# Patient Record
Sex: MALE | Race: WHITE | URBAN - METROPOLITAN AREA
[De-identification: names, ages, dates, MRNs, and addresses within clinical notes are randomized per-mention and may not be internally consistent; named-entity substitution may affect disease eponyms.]

---

## 2017-02-17 ENCOUNTER — HOSPITAL ENCOUNTER (OUTPATIENT)
Dept: SURGERY | Age: 37
Discharge: HOME | End: 2017-02-17
Attending: ORTHOPAEDIC SURGERY | Admitting: ORTHOPAEDIC SURGERY

## 2017-02-17 VITALS
DIASTOLIC BLOOD PRESSURE: 82 MMHG | WEIGHT: 206 LBS | RESPIRATION RATE: 20 BRPM | TEMPERATURE: 97.2 F | HEART RATE: 79 BPM | SYSTOLIC BLOOD PRESSURE: 117 MMHG | HEIGHT: 70 IN | OXYGEN SATURATION: 97 % | BODY MASS INDEX: 29.49 KG/M2

## 2017-02-17 RX ORDER — OXYCODONE HYDROCHLORIDE AND ACETAMINOPHEN 5; 325 MG/1; MG/1
2 TABLET ORAL PRN
Status: COMPLETED | OUTPATIENT
Start: 2017-02-17 | End: 2017-02-17

## 2017-02-17 RX ORDER — ONDANSETRON 2 MG/ML
4 INJECTION INTRAMUSCULAR; INTRAVENOUS PRN
Status: DISCONTINUED | OUTPATIENT
Start: 2017-02-17 | End: 2017-02-18 | Stop reason: HOSPADM

## 2017-02-17 RX ORDER — MEPERIDINE HYDROCHLORIDE 50 MG/ML
12.5 INJECTION INTRAMUSCULAR; INTRAVENOUS; SUBCUTANEOUS EVERY 5 MIN PRN
Status: DISCONTINUED | OUTPATIENT
Start: 2017-02-17 | End: 2017-02-18 | Stop reason: HOSPADM

## 2017-02-17 RX ORDER — PROMETHAZINE HYDROCHLORIDE 25 MG/ML
6.25 INJECTION, SOLUTION INTRAMUSCULAR; INTRAVENOUS
Status: DISCONTINUED | OUTPATIENT
Start: 2017-02-17 | End: 2017-02-18 | Stop reason: HOSPADM

## 2017-02-17 RX ORDER — OXYCODONE HYDROCHLORIDE AND ACETAMINOPHEN 5; 325 MG/1; MG/1
1 TABLET ORAL PRN
Status: COMPLETED | OUTPATIENT
Start: 2017-02-17 | End: 2017-02-17

## 2017-02-17 RX ORDER — SODIUM CHLORIDE 0.9 % (FLUSH) 0.9 %
10 SYRINGE (ML) INJECTION PRN
Status: DISCONTINUED | OUTPATIENT
Start: 2017-02-17 | End: 2017-02-18 | Stop reason: HOSPADM

## 2017-02-17 RX ORDER — HYDRALAZINE HYDROCHLORIDE 20 MG/ML
5 INJECTION INTRAMUSCULAR; INTRAVENOUS EVERY 10 MIN PRN
Status: DISCONTINUED | OUTPATIENT
Start: 2017-02-17 | End: 2017-02-18 | Stop reason: HOSPADM

## 2017-02-17 RX ORDER — HYDROCODONE BITARTRATE AND ACETAMINOPHEN 5; 325 MG/1; MG/1
2 TABLET ORAL EVERY 6 HOURS PRN
Qty: 40 TABLET | Refills: 0 | Status: SHIPPED | OUTPATIENT
Start: 2017-02-17 | End: 2017-02-24

## 2017-02-17 RX ORDER — SODIUM CHLORIDE, SODIUM LACTATE, POTASSIUM CHLORIDE, CALCIUM CHLORIDE 600; 310; 30; 20 MG/100ML; MG/100ML; MG/100ML; MG/100ML
INJECTION, SOLUTION INTRAVENOUS CONTINUOUS
Status: DISCONTINUED | OUTPATIENT
Start: 2017-02-17 | End: 2017-02-18 | Stop reason: HOSPADM

## 2017-02-17 RX ORDER — DIPHENHYDRAMINE HYDROCHLORIDE 50 MG/ML
12.5 INJECTION INTRAMUSCULAR; INTRAVENOUS
Status: ACTIVE | OUTPATIENT
Start: 2017-02-17 | End: 2017-02-17

## 2017-02-17 RX ORDER — MORPHINE SULFATE 2 MG/ML
1 INJECTION, SOLUTION INTRAMUSCULAR; INTRAVENOUS EVERY 5 MIN PRN
Status: DISCONTINUED | OUTPATIENT
Start: 2017-02-17 | End: 2017-02-18 | Stop reason: HOSPADM

## 2017-02-17 RX ORDER — LABETALOL HYDROCHLORIDE 5 MG/ML
5 INJECTION, SOLUTION INTRAVENOUS EVERY 10 MIN PRN
Status: DISCONTINUED | OUTPATIENT
Start: 2017-02-17 | End: 2017-02-18 | Stop reason: HOSPADM

## 2017-02-17 RX ORDER — SODIUM CHLORIDE 0.9 % (FLUSH) 0.9 %
10 SYRINGE (ML) INJECTION EVERY 12 HOURS SCHEDULED
Status: DISCONTINUED | OUTPATIENT
Start: 2017-02-17 | End: 2017-02-18 | Stop reason: HOSPADM

## 2017-02-17 RX ORDER — LIDOCAINE HYDROCHLORIDE 10 MG/ML
1 INJECTION, SOLUTION EPIDURAL; INFILTRATION; INTRACAUDAL; PERINEURAL
Status: ACTIVE | OUTPATIENT
Start: 2017-02-17 | End: 2017-02-17

## 2017-02-17 RX ORDER — MORPHINE SULFATE 2 MG/ML
2 INJECTION, SOLUTION INTRAMUSCULAR; INTRAVENOUS EVERY 5 MIN PRN
Status: DISCONTINUED | OUTPATIENT
Start: 2017-02-17 | End: 2017-02-18 | Stop reason: HOSPADM

## 2017-02-17 RX ADMIN — OXYCODONE HYDROCHLORIDE AND ACETAMINOPHEN 2 TABLET: 5; 325 TABLET ORAL at 13:51

## 2017-02-17 ASSESSMENT — PAIN SCALES - GENERAL
PAINLEVEL_OUTOF10: 6
PAINLEVEL_OUTOF10: 0
PAINLEVEL_OUTOF10: 6

## 2017-02-17 ASSESSMENT — PAIN DESCRIPTION - DESCRIPTORS: DESCRIPTORS: ACHING;SORE

## 2017-02-17 ASSESSMENT — PAIN DESCRIPTION - LOCATION: LOCATION: LEG

## 2017-02-17 ASSESSMENT — PAIN DESCRIPTION - ORIENTATION: ORIENTATION: RIGHT

## 2017-02-17 ASSESSMENT — PAIN - FUNCTIONAL ASSESSMENT: PAIN_FUNCTIONAL_ASSESSMENT: 0-10

## 2023-11-06 ENCOUNTER — HOSPITAL ENCOUNTER (INPATIENT)
Age: 43
LOS: 1 days | Discharge: HOME | DRG: 420 | End: 2023-11-07
Attending: EMERGENCY MEDICINE | Admitting: FAMILY MEDICINE
Payer: COMMERCIAL

## 2023-11-06 DIAGNOSIS — C79.9 METASTATIC MALIGNANT NEOPLASM, UNSPECIFIED SITE (HCC): ICD-10-CM

## 2023-11-06 DIAGNOSIS — E11.10 DIABETIC KETOACIDOSIS WITHOUT COMA ASSOCIATED WITH TYPE 2 DIABETES MELLITUS (HCC): Primary | ICD-10-CM

## 2023-11-06 LAB
ALBUMIN SERPL-MCNC: 3.8 G/DL (ref 3.4–5)
ALBUMIN SERPL-MCNC: 4.4 G/DL (ref 3.4–5)
ANION GAP SERPL CALCULATED.3IONS-SCNC: 18 MMOL/L (ref 3–16)
ANION GAP SERPL CALCULATED.3IONS-SCNC: 20 MMOL/L (ref 3–16)
BASE EXCESS BLDV CALC-SCNC: -3.7 MMOL/L (ref -2–3)
BASOPHILS # BLD: 0 K/UL (ref 0–0.2)
BASOPHILS NFR BLD: 0.4 %
BILIRUB UR QL STRIP.AUTO: NEGATIVE
BUN SERPL-MCNC: 19 MG/DL (ref 7–20)
BUN SERPL-MCNC: 23 MG/DL (ref 7–20)
CALCIUM SERPL-MCNC: 8.9 MG/DL (ref 8.3–10.6)
CALCIUM SERPL-MCNC: 9.1 MG/DL (ref 8.3–10.6)
CHLORIDE SERPL-SCNC: 87 MMOL/L (ref 99–110)
CHLORIDE SERPL-SCNC: 93 MMOL/L (ref 99–110)
CLARITY UR: CLEAR
CO2 BLDV-SCNC: 22 MMOL/L
CO2 SERPL-SCNC: 14 MMOL/L (ref 21–32)
CO2 SERPL-SCNC: 18 MMOL/L (ref 21–32)
COHGB MFR BLDV: 1.1 % (ref 0–1.5)
COLOR UR: YELLOW
CREAT SERPL-MCNC: 0.8 MG/DL (ref 0.9–1.3)
CREAT SERPL-MCNC: 0.9 MG/DL (ref 0.9–1.3)
DEPRECATED RDW RBC AUTO: 15.3 % (ref 12.4–15.4)
DO-HGB MFR BLDV: 6.6 %
EKG ATRIAL RATE: 57 BPM
EKG DIAGNOSIS: NORMAL
EKG P AXIS: -19 DEGREES
EKG P-R INTERVAL: 102 MS
EKG Q-T INTERVAL: 478 MS
EKG QRS DURATION: 86 MS
EKG QTC CALCULATION (BAZETT): 465 MS
EKG R AXIS: 28 DEGREES
EKG T AXIS: 58 DEGREES
EKG VENTRICULAR RATE: 57 BPM
EOSINOPHIL # BLD: 0 K/UL (ref 0–0.6)
EOSINOPHIL NFR BLD: 0 %
GFR SERPLBLD CREATININE-BSD FMLA CKD-EPI: >60 ML/MIN/{1.73_M2}
GFR SERPLBLD CREATININE-BSD FMLA CKD-EPI: >60 ML/MIN/{1.73_M2}
GLUCOSE BLD-MCNC: 386 MG/DL (ref 70–99)
GLUCOSE BLD-MCNC: 387 MG/DL (ref 70–99)
GLUCOSE BLD-MCNC: 407 MG/DL (ref 70–99)
GLUCOSE BLD-MCNC: 411 MG/DL (ref 70–99)
GLUCOSE BLD-MCNC: 528 MG/DL (ref 70–99)
GLUCOSE BLD-MCNC: >600 MG/DL (ref 70–99)
GLUCOSE SERPL-MCNC: 389 MG/DL (ref 70–99)
GLUCOSE SERPL-MCNC: 732 MG/DL (ref 70–99)
GLUCOSE UR STRIP.AUTO-MCNC: >=1000 MG/DL
HCO3 BLDV-SCNC: 20.5 MMOL/L (ref 24–28)
HCT VFR BLD AUTO: 37.9 % (ref 40.5–52.5)
HGB BLD-MCNC: 12.2 G/DL (ref 13.5–17.5)
HGB UR QL STRIP.AUTO: NEGATIVE
KETONES UR STRIP.AUTO-MCNC: 40 MG/DL
LEUKOCYTE ESTERASE UR QL STRIP.AUTO: NEGATIVE
LYMPHOCYTES # BLD: 0.2 K/UL (ref 1–5.1)
LYMPHOCYTES NFR BLD: 8.9 %
MAGNESIUM SERPL-MCNC: 2 MG/DL (ref 1.8–2.4)
MCH RBC QN AUTO: 29.3 PG (ref 26–34)
MCHC RBC AUTO-ENTMCNC: 32.1 G/DL (ref 31–36)
MCV RBC AUTO: 91.1 FL (ref 80–100)
METHGB MFR BLDV: 0.1 % (ref 0–1.5)
MONOCYTES # BLD: 0.2 K/UL (ref 0–1.3)
MONOCYTES NFR BLD: 8 %
NEUTROPHILS # BLD: 1.7 K/UL (ref 1.7–7.7)
NEUTROPHILS NFR BLD: 82.7 %
NITRITE UR QL STRIP.AUTO: NEGATIVE
PCO2 BLDV: 33.4 MMHG (ref 41–51)
PERFORMED ON: ABNORMAL
PH BLDV: 7.39 [PH] (ref 7.35–7.45)
PH UR STRIP.AUTO: 6.5 [PH] (ref 5–8)
PHOSPHATE SERPL-MCNC: 2.6 MG/DL (ref 2.5–4.9)
PHOSPHATE SERPL-MCNC: 2.9 MG/DL (ref 2.5–4.9)
PLATELET # BLD AUTO: 116 K/UL (ref 135–450)
PMV BLD AUTO: 10.4 FL (ref 5–10.5)
PO2 BLDV: 73 MMHG (ref 25–40)
POTASSIUM SERPL-SCNC: 4 MMOL/L (ref 3.5–5.1)
POTASSIUM SERPL-SCNC: 5.7 MMOL/L (ref 3.5–5.1)
PROT UR STRIP.AUTO-MCNC: NEGATIVE MG/DL
RBC # BLD AUTO: 4.16 M/UL (ref 4.2–5.9)
RBC #/AREA URNS HPF: ABNORMAL /HPF (ref 0–4)
SAO2 % BLDV: 93 %
SODIUM SERPL-SCNC: 125 MMOL/L (ref 136–145)
SODIUM SERPL-SCNC: 125 MMOL/L (ref 136–145)
SP GR UR STRIP.AUTO: 1.01 (ref 1–1.03)
UA DIPSTICK W REFLEX MICRO PNL UR: ABNORMAL
URN SPEC COLLECT METH UR: ABNORMAL
UROBILINOGEN UR STRIP-ACNC: 0.2 E.U./DL
WBC # BLD AUTO: 2.1 K/UL (ref 4–11)
WBC #/AREA URNS HPF: ABNORMAL /HPF (ref 0–5)

## 2023-11-06 PROCEDURE — 82803 BLOOD GASES ANY COMBINATION: CPT

## 2023-11-06 PROCEDURE — 96374 THER/PROPH/DIAG INJ IV PUSH: CPT

## 2023-11-06 PROCEDURE — 36415 COLL VENOUS BLD VENIPUNCTURE: CPT

## 2023-11-06 PROCEDURE — 96361 HYDRATE IV INFUSION ADD-ON: CPT

## 2023-11-06 PROCEDURE — 93005 ELECTROCARDIOGRAM TRACING: CPT

## 2023-11-06 PROCEDURE — 82010 KETONE BODYS QUAN: CPT

## 2023-11-06 PROCEDURE — 83735 ASSAY OF MAGNESIUM: CPT

## 2023-11-06 PROCEDURE — 80069 RENAL FUNCTION PANEL: CPT

## 2023-11-06 PROCEDURE — 81001 URINALYSIS AUTO W/SCOPE: CPT

## 2023-11-06 PROCEDURE — 2580000003 HC RX 258

## 2023-11-06 PROCEDURE — 6370000000 HC RX 637 (ALT 250 FOR IP)

## 2023-11-06 PROCEDURE — 99285 EMERGENCY DEPT VISIT HI MDM: CPT

## 2023-11-06 PROCEDURE — 2000000000 HC ICU R&B

## 2023-11-06 PROCEDURE — 85025 COMPLETE CBC W/AUTO DIFF WBC: CPT

## 2023-11-06 RX ORDER — 0.9 % SODIUM CHLORIDE 0.9 %
1000 INTRAVENOUS SOLUTION INTRAVENOUS ONCE
Status: DISCONTINUED | OUTPATIENT
Start: 2023-11-06 | End: 2023-11-06

## 2023-11-06 RX ORDER — DEXTROSE MONOHYDRATE 100 MG/ML
INJECTION, SOLUTION INTRAVENOUS CONTINUOUS PRN
Status: DISCONTINUED | OUTPATIENT
Start: 2023-11-06 | End: 2023-11-07 | Stop reason: HOSPADM

## 2023-11-06 RX ORDER — OXYCODONE HYDROCHLORIDE 5 MG/1
10 TABLET ORAL EVERY 4 HOURS PRN
Status: DISCONTINUED | OUTPATIENT
Start: 2023-11-06 | End: 2023-11-07

## 2023-11-06 RX ORDER — OXYCODONE HYDROCHLORIDE 5 MG/1
10 TABLET ORAL EVERY 4 HOURS PRN
Status: DISCONTINUED | OUTPATIENT
Start: 2023-11-06 | End: 2023-11-06

## 2023-11-06 RX ORDER — OXYCODONE HYDROCHLORIDE 15 MG/1
15 TABLET ORAL EVERY 4 HOURS PRN
Status: DISCONTINUED | OUTPATIENT
Start: 2023-11-06 | End: 2023-11-07

## 2023-11-06 RX ORDER — MORPHINE SULFATE 4 MG/ML
4 INJECTION INTRAVENOUS
Status: DISCONTINUED | OUTPATIENT
Start: 2023-11-06 | End: 2023-11-06

## 2023-11-06 RX ORDER — SODIUM CHLORIDE 450 MG/100ML
INJECTION, SOLUTION INTRAVENOUS CONTINUOUS
Status: DISCONTINUED | OUTPATIENT
Start: 2023-11-06 | End: 2023-11-07 | Stop reason: ALTCHOICE

## 2023-11-06 RX ORDER — SODIUM CHLORIDE 9 MG/ML
1000 INJECTION, SOLUTION INTRAVENOUS CONTINUOUS
Status: DISCONTINUED | OUTPATIENT
Start: 2023-11-06 | End: 2023-11-07 | Stop reason: ALTCHOICE

## 2023-11-06 RX ORDER — POTASSIUM CHLORIDE 7.45 MG/ML
10 INJECTION INTRAVENOUS PRN
Status: DISCONTINUED | OUTPATIENT
Start: 2023-11-06 | End: 2023-11-07

## 2023-11-06 RX ORDER — MORPHINE SULFATE 2 MG/ML
2 INJECTION, SOLUTION INTRAMUSCULAR; INTRAVENOUS
Status: DISCONTINUED | OUTPATIENT
Start: 2023-11-06 | End: 2023-11-06

## 2023-11-06 RX ORDER — MAGNESIUM SULFATE IN WATER 40 MG/ML
2000 INJECTION, SOLUTION INTRAVENOUS PRN
Status: DISCONTINUED | OUTPATIENT
Start: 2023-11-06 | End: 2023-11-07

## 2023-11-06 RX ORDER — DEXTROSE AND SODIUM CHLORIDE 5; .45 G/100ML; G/100ML
INJECTION, SOLUTION INTRAVENOUS CONTINUOUS PRN
Status: DISCONTINUED | OUTPATIENT
Start: 2023-11-06 | End: 2023-11-07 | Stop reason: SDUPTHER

## 2023-11-06 RX ORDER — OXYCODONE HYDROCHLORIDE 5 MG/1
5 TABLET ORAL EVERY 4 HOURS PRN
Status: DISCONTINUED | OUTPATIENT
Start: 2023-11-06 | End: 2023-11-06

## 2023-11-06 RX ORDER — ONDANSETRON 2 MG/ML
4 INJECTION INTRAMUSCULAR; INTRAVENOUS EVERY 6 HOURS PRN
Status: DISCONTINUED | OUTPATIENT
Start: 2023-11-06 | End: 2023-11-07 | Stop reason: HOSPADM

## 2023-11-06 RX ORDER — 0.9 % SODIUM CHLORIDE 0.9 %
1000 INTRAVENOUS SOLUTION INTRAVENOUS ONCE
Status: COMPLETED | OUTPATIENT
Start: 2023-11-06 | End: 2023-11-06

## 2023-11-06 RX ORDER — OXYCODONE HYDROCHLORIDE 5 MG/1
10 TABLET ORAL ONCE
Status: COMPLETED | OUTPATIENT
Start: 2023-11-06 | End: 2023-11-06

## 2023-11-06 RX ADMIN — SODIUM CHLORIDE: 4.5 INJECTION, SOLUTION INTRAVENOUS at 23:50

## 2023-11-06 RX ADMIN — SODIUM CHLORIDE 1000 ML: 9 INJECTION, SOLUTION INTRAVENOUS at 17:39

## 2023-11-06 RX ADMIN — OXYCODONE HYDROCHLORIDE 10 MG: 5 TABLET ORAL at 19:03

## 2023-11-06 RX ADMIN — SODIUM CHLORIDE 6.54 UNITS/HR: 9 INJECTION, SOLUTION INTRAVENOUS at 23:53

## 2023-11-06 RX ADMIN — INSULIN HUMAN 10 UNITS: 100 INJECTION, SOLUTION PARENTERAL at 17:42

## 2023-11-06 RX ADMIN — OXYCODONE HYDROCHLORIDE 10 MG: 5 TABLET ORAL at 21:17

## 2023-11-06 RX ADMIN — SODIUM CHLORIDE 1000 ML: 0.9 INJECTION, SOLUTION INTRAVENOUS at 19:42

## 2023-11-06 ASSESSMENT — ENCOUNTER SYMPTOMS
RESPIRATORY NEGATIVE: 1
NAUSEA: 1
VOMITING: 1

## 2023-11-06 ASSESSMENT — PAIN SCALES - GENERAL: PAINLEVEL_OUTOF10: 9

## 2023-11-07 VITALS
TEMPERATURE: 98.3 F | BODY MASS INDEX: 22.28 KG/M2 | HEART RATE: 76 BPM | OXYGEN SATURATION: 100 % | DIASTOLIC BLOOD PRESSURE: 78 MMHG | SYSTOLIC BLOOD PRESSURE: 123 MMHG | RESPIRATION RATE: 18 BRPM | HEIGHT: 70 IN | WEIGHT: 155.65 LBS

## 2023-11-07 LAB
ANION GAP SERPL CALCULATED.3IONS-SCNC: 12 MMOL/L (ref 3–16)
ANION GAP SERPL CALCULATED.3IONS-SCNC: 12 MMOL/L (ref 3–16)
BASOPHILS # BLD: 0 K/UL (ref 0–0.2)
BASOPHILS NFR BLD: 0.3 %
BETA-HYDROXYBUTYRATE: 2.82 MMOL/L (ref 0–0.27)
BUN SERPL-MCNC: 16 MG/DL (ref 7–20)
BUN SERPL-MCNC: 17 MG/DL (ref 7–20)
CALCIUM SERPL-MCNC: 8.7 MG/DL (ref 8.3–10.6)
CALCIUM SERPL-MCNC: 8.8 MG/DL (ref 8.3–10.6)
CHLORIDE SERPL-SCNC: 102 MMOL/L (ref 99–110)
CHLORIDE SERPL-SCNC: 104 MMOL/L (ref 99–110)
CO2 SERPL-SCNC: 22 MMOL/L (ref 21–32)
CO2 SERPL-SCNC: 22 MMOL/L (ref 21–32)
CREAT SERPL-MCNC: 0.7 MG/DL (ref 0.9–1.3)
CREAT SERPL-MCNC: 0.8 MG/DL (ref 0.9–1.3)
DEPRECATED RDW RBC AUTO: 15 % (ref 12.4–15.4)
EOSINOPHIL # BLD: 0 K/UL (ref 0–0.6)
EOSINOPHIL NFR BLD: 0.4 %
GFR SERPLBLD CREATININE-BSD FMLA CKD-EPI: >60 ML/MIN/{1.73_M2}
GFR SERPLBLD CREATININE-BSD FMLA CKD-EPI: >60 ML/MIN/{1.73_M2}
GLUCOSE BLD-MCNC: 100 MG/DL (ref 70–99)
GLUCOSE BLD-MCNC: 104 MG/DL (ref 70–99)
GLUCOSE BLD-MCNC: 140 MG/DL (ref 70–99)
GLUCOSE BLD-MCNC: 147 MG/DL (ref 70–99)
GLUCOSE BLD-MCNC: 155 MG/DL (ref 70–99)
GLUCOSE BLD-MCNC: 162 MG/DL (ref 70–99)
GLUCOSE BLD-MCNC: 210 MG/DL (ref 70–99)
GLUCOSE BLD-MCNC: 218 MG/DL (ref 70–99)
GLUCOSE BLD-MCNC: 274 MG/DL (ref 70–99)
GLUCOSE BLD-MCNC: 287 MG/DL (ref 70–99)
GLUCOSE BLD-MCNC: 290 MG/DL (ref 70–99)
GLUCOSE BLD-MCNC: 359 MG/DL (ref 70–99)
GLUCOSE SERPL-MCNC: 109 MG/DL (ref 70–99)
GLUCOSE SERPL-MCNC: 148 MG/DL (ref 70–99)
HCT VFR BLD AUTO: 33.5 % (ref 40.5–52.5)
HGB BLD-MCNC: 11 G/DL (ref 13.5–17.5)
LIPASE SERPL-CCNC: 33 U/L (ref 13–60)
LYMPHOCYTES # BLD: 0.4 K/UL (ref 1–5.1)
LYMPHOCYTES NFR BLD: 19.9 %
MAGNESIUM SERPL-MCNC: 1.7 MG/DL (ref 1.8–2.4)
MAGNESIUM SERPL-MCNC: 1.8 MG/DL (ref 1.8–2.4)
MCH RBC QN AUTO: 29.5 PG (ref 26–34)
MCHC RBC AUTO-ENTMCNC: 33 G/DL (ref 31–36)
MCV RBC AUTO: 89.6 FL (ref 80–100)
MONOCYTES # BLD: 0.3 K/UL (ref 0–1.3)
MONOCYTES NFR BLD: 16.5 %
NEUTROPHILS # BLD: 1.3 K/UL (ref 1.7–7.7)
NEUTROPHILS NFR BLD: 62.9 %
PERFORMED ON: ABNORMAL
PHOSPHATE SERPL-MCNC: 1.9 MG/DL (ref 2.5–4.9)
PHOSPHATE SERPL-MCNC: 2.5 MG/DL (ref 2.5–4.9)
PLATELET # BLD AUTO: 118 K/UL (ref 135–450)
PMV BLD AUTO: 9.4 FL (ref 5–10.5)
POTASSIUM SERPL-SCNC: 3 MMOL/L (ref 3.5–5.1)
POTASSIUM SERPL-SCNC: 3.2 MMOL/L (ref 3.5–5.1)
RBC # BLD AUTO: 3.74 M/UL (ref 4.2–5.9)
SODIUM SERPL-SCNC: 136 MMOL/L (ref 136–145)
SODIUM SERPL-SCNC: 138 MMOL/L (ref 136–145)
WBC # BLD AUTO: 2.1 K/UL (ref 4–11)

## 2023-11-07 PROCEDURE — 83690 ASSAY OF LIPASE: CPT

## 2023-11-07 PROCEDURE — 36415 COLL VENOUS BLD VENIPUNCTURE: CPT

## 2023-11-07 PROCEDURE — 2500000003 HC RX 250 WO HCPCS

## 2023-11-07 PROCEDURE — 99222 1ST HOSP IP/OBS MODERATE 55: CPT | Performed by: INTERNAL MEDICINE

## 2023-11-07 PROCEDURE — 80048 BASIC METABOLIC PNL TOTAL CA: CPT

## 2023-11-07 PROCEDURE — 2580000003 HC RX 258

## 2023-11-07 PROCEDURE — 6370000000 HC RX 637 (ALT 250 FOR IP)

## 2023-11-07 PROCEDURE — 83735 ASSAY OF MAGNESIUM: CPT

## 2023-11-07 PROCEDURE — 83036 HEMOGLOBIN GLYCOSYLATED A1C: CPT

## 2023-11-07 PROCEDURE — 85025 COMPLETE CBC W/AUTO DIFF WBC: CPT

## 2023-11-07 PROCEDURE — 36591 DRAW BLOOD OFF VENOUS DEVICE: CPT

## 2023-11-07 PROCEDURE — 84100 ASSAY OF PHOSPHORUS: CPT

## 2023-11-07 PROCEDURE — 6360000002 HC RX W HCPCS

## 2023-11-07 RX ORDER — CETIRIZINE HYDROCHLORIDE 10 MG/1
10 TABLET ORAL DAILY
COMMUNITY

## 2023-11-07 RX ORDER — SODIUM CHLORIDE 9 MG/ML
INJECTION, SOLUTION INTRAVENOUS CONTINUOUS
Status: DISCONTINUED | OUTPATIENT
Start: 2023-11-07 | End: 2023-11-07 | Stop reason: HOSPADM

## 2023-11-07 RX ORDER — INSULIN LISPRO 100 [IU]/ML
3 INJECTION, SOLUTION INTRAVENOUS; SUBCUTANEOUS
Status: DISCONTINUED | OUTPATIENT
Start: 2023-11-07 | End: 2023-11-07 | Stop reason: HOSPADM

## 2023-11-07 RX ORDER — POTASSIUM CHLORIDE 7.45 MG/ML
10 INJECTION INTRAVENOUS PRN
Status: DISCONTINUED | OUTPATIENT
Start: 2023-11-07 | End: 2023-11-07 | Stop reason: HOSPADM

## 2023-11-07 RX ORDER — INSULIN GLARGINE 100 [IU]/ML
5 INJECTION, SOLUTION SUBCUTANEOUS ONCE
Status: COMPLETED | OUTPATIENT
Start: 2023-11-07 | End: 2023-11-07

## 2023-11-07 RX ORDER — INSULIN GLARGINE 100 [IU]/ML
15 INJECTION, SOLUTION SUBCUTANEOUS NIGHTLY
COMMUNITY

## 2023-11-07 RX ORDER — 0.9 % SODIUM CHLORIDE 0.9 %
15 INTRAVENOUS SOLUTION INTRAVENOUS ONCE
Status: COMPLETED | OUTPATIENT
Start: 2023-11-07 | End: 2023-11-07

## 2023-11-07 RX ORDER — DEXAMETHASONE 4 MG/1
4 TABLET ORAL
COMMUNITY

## 2023-11-07 RX ORDER — INSULIN GLARGINE 100 [IU]/ML
15 INJECTION, SOLUTION SUBCUTANEOUS NIGHTLY
Status: DISCONTINUED | OUTPATIENT
Start: 2023-11-07 | End: 2023-11-07 | Stop reason: HOSPADM

## 2023-11-07 RX ORDER — FOLIC ACID 1 MG/1
5 TABLET ORAL DAILY
COMMUNITY

## 2023-11-07 RX ORDER — POTASSIUM CHLORIDE 20 MEQ/1
40 TABLET, EXTENDED RELEASE ORAL ONCE
Status: COMPLETED | OUTPATIENT
Start: 2023-11-07 | End: 2023-11-07

## 2023-11-07 RX ORDER — OXYCODONE HYDROCHLORIDE 15 MG/1
30 TABLET ORAL EVERY 4 HOURS PRN
Status: DISCONTINUED | OUTPATIENT
Start: 2023-11-07 | End: 2023-11-07 | Stop reason: HOSPADM

## 2023-11-07 RX ORDER — INSULIN LISPRO 100 [IU]/ML
3 INJECTION, SOLUTION INTRAVENOUS; SUBCUTANEOUS 2 TIMES DAILY WITH MEALS
Status: ON HOLD | COMMUNITY
End: 2023-11-07 | Stop reason: SDUPTHER

## 2023-11-07 RX ORDER — CELECOXIB 100 MG/1
100 CAPSULE ORAL DAILY
COMMUNITY

## 2023-11-07 RX ORDER — MAGNESIUM SULFATE IN WATER 40 MG/ML
2000 INJECTION, SOLUTION INTRAVENOUS PRN
Status: DISCONTINUED | OUTPATIENT
Start: 2023-11-07 | End: 2023-11-07 | Stop reason: HOSPADM

## 2023-11-07 RX ORDER — GABAPENTIN 400 MG/1
400 CAPSULE ORAL 2 TIMES DAILY
COMMUNITY

## 2023-11-07 RX ORDER — ENOXAPARIN SODIUM 100 MG/ML
40 INJECTION SUBCUTANEOUS DAILY
Status: DISCONTINUED | OUTPATIENT
Start: 2023-11-07 | End: 2023-11-07

## 2023-11-07 RX ORDER — HEPARIN 100 UNIT/ML
500 SYRINGE INTRAVENOUS PRN
Status: DISCONTINUED | OUTPATIENT
Start: 2023-11-07 | End: 2023-11-07 | Stop reason: HOSPADM

## 2023-11-07 RX ORDER — FOLIC ACID 5 MG/ML
1 INJECTION, SOLUTION INTRAMUSCULAR; INTRAVENOUS; SUBCUTANEOUS DAILY
Status: ON HOLD | COMMUNITY
End: 2023-11-07 | Stop reason: ALTCHOICE

## 2023-11-07 RX ORDER — DEXAMETHASONE 4 MG/1
4 TABLET ORAL
Status: DISCONTINUED | OUTPATIENT
Start: 2023-11-07 | End: 2023-11-07 | Stop reason: HOSPADM

## 2023-11-07 RX ORDER — DEXTROSE AND SODIUM CHLORIDE 5; .45 G/100ML; G/100ML
INJECTION, SOLUTION INTRAVENOUS CONTINUOUS PRN
Status: DISCONTINUED | OUTPATIENT
Start: 2023-11-07 | End: 2023-11-07 | Stop reason: HOSPADM

## 2023-11-07 RX ORDER — CETIRIZINE HYDROCHLORIDE, PSEUDOEPHEDRINE HYDROCHLORIDE 5; 120 MG/1; MG/1
1 TABLET, FILM COATED, EXTENDED RELEASE ORAL 2 TIMES DAILY
Status: ON HOLD | COMMUNITY
End: 2023-11-07 | Stop reason: ALTCHOICE

## 2023-11-07 RX ORDER — DRONABINOL 10 MG/1
10 CAPSULE ORAL DAILY PRN
COMMUNITY

## 2023-11-07 RX ORDER — INSULIN LISPRO 100 [IU]/ML
5 INJECTION, SOLUTION INTRAVENOUS; SUBCUTANEOUS
Qty: 3 ADJUSTABLE DOSE PRE-FILLED PEN SYRINGE | Refills: 3 | Status: SHIPPED | OUTPATIENT
Start: 2023-11-07

## 2023-11-07 RX ORDER — POLYETHYLENE GLYCOL 3350 17 G/17G
17 POWDER, FOR SOLUTION ORAL DAILY PRN
Status: DISCONTINUED | OUTPATIENT
Start: 2023-11-07 | End: 2023-11-07 | Stop reason: HOSPADM

## 2023-11-07 RX ADMIN — SODIUM CHLORIDE 3.16 UNITS/HR: 9 INJECTION, SOLUTION INTRAVENOUS at 02:44

## 2023-11-07 RX ADMIN — SODIUM CHLORIDE 2.3 UNITS/HR: 9 INJECTION, SOLUTION INTRAVENOUS at 01:34

## 2023-11-07 RX ADMIN — APIXABAN 5 MG: 5 TABLET, FILM COATED ORAL at 08:23

## 2023-11-07 RX ADMIN — SODIUM PHOSPHATE, MONOBASIC, MONOHYDRATE AND SODIUM PHOSPHATE, DIBASIC, ANHYDROUS 15 MMOL: 142; 276 INJECTION, SOLUTION INTRAVENOUS at 06:26

## 2023-11-07 RX ADMIN — OXYCODONE HYDROCHLORIDE 30 MG: 15 TABLET ORAL at 00:25

## 2023-11-07 RX ADMIN — INSULIN HUMAN 5 UNITS: 100 INJECTION, SOLUTION PARENTERAL at 12:26

## 2023-11-07 RX ADMIN — SODIUM CHLORIDE 1000 ML: 9 INJECTION, SOLUTION INTRAVENOUS at 02:41

## 2023-11-07 RX ADMIN — DEXAMETHASONE 4 MG: 4 TABLET ORAL at 12:30

## 2023-11-07 RX ADMIN — OXYCODONE HYDROCHLORIDE 30 MG: 15 TABLET ORAL at 04:35

## 2023-11-07 RX ADMIN — DEXTROSE AND SODIUM CHLORIDE: 5; 450 INJECTION, SOLUTION INTRAVENOUS at 05:07

## 2023-11-07 RX ADMIN — POTASSIUM CHLORIDE 10 MEQ: 10 INJECTION, SOLUTION INTRAVENOUS at 07:47

## 2023-11-07 RX ADMIN — INSULIN GLARGINE 5 UNITS: 100 INJECTION, SOLUTION SUBCUTANEOUS at 09:43

## 2023-11-07 RX ADMIN — POTASSIUM CHLORIDE 10 MEQ: 10 INJECTION, SOLUTION INTRAVENOUS at 05:41

## 2023-11-07 RX ADMIN — APIXABAN 5 MG: 5 TABLET, FILM COATED ORAL at 01:52

## 2023-11-07 RX ADMIN — OXYCODONE HYDROCHLORIDE 30 MG: 15 TABLET ORAL at 13:43

## 2023-11-07 RX ADMIN — SODIUM CHLORIDE: 9 INJECTION, SOLUTION INTRAVENOUS at 03:53

## 2023-11-07 RX ADMIN — POTASSIUM CHLORIDE 40 MEQ: 1500 TABLET, EXTENDED RELEASE ORAL at 09:42

## 2023-11-07 RX ADMIN — OXYCODONE HYDROCHLORIDE 30 MG: 15 TABLET ORAL at 09:18

## 2023-11-07 ASSESSMENT — PAIN DESCRIPTION - DESCRIPTORS
DESCRIPTORS: SORE
DESCRIPTORS: ACHING;SORE
DESCRIPTORS: DISCOMFORT
DESCRIPTORS: ACHING;SORE
DESCRIPTORS: DISCOMFORT
DESCRIPTORS: ACHING;SORE

## 2023-11-07 ASSESSMENT — ENCOUNTER SYMPTOMS
VOMITING: 1
COLOR CHANGE: 0
SHORTNESS OF BREATH: 0
CONSTIPATION: 0
VOMITING: 0
PHOTOPHOBIA: 0
APNEA: 0
WHEEZING: 0
TROUBLE SWALLOWING: 0
CHEST TIGHTNESS: 0
ALLERGIC/IMMUNOLOGIC NEGATIVE: 1
DIARRHEA: 1
NAUSEA: 0
EYES NEGATIVE: 1
COUGH: 0
BLOOD IN STOOL: 0
DIARRHEA: 0
ABDOMINAL DISTENTION: 0
RHINORRHEA: 0
NAUSEA: 1
SHORTNESS OF BREATH: 1
BACK PAIN: 0
ABDOMINAL PAIN: 0

## 2023-11-07 ASSESSMENT — PAIN SCALES - GENERAL
PAINLEVEL_OUTOF10: 7
PAINLEVEL_OUTOF10: 6
PAINLEVEL_OUTOF10: 10
PAINLEVEL_OUTOF10: 6
PAINLEVEL_OUTOF10: 6

## 2023-11-07 ASSESSMENT — PAIN DESCRIPTION - ORIENTATION
ORIENTATION: LEFT
ORIENTATION: LEFT
ORIENTATION: RIGHT;LEFT;MID;LOWER
ORIENTATION: LEFT;RIGHT
ORIENTATION: RIGHT;LEFT;MID;LOWER
ORIENTATION: RIGHT;LEFT;LOWER;MID

## 2023-11-07 ASSESSMENT — PAIN DESCRIPTION - FREQUENCY
FREQUENCY: CONTINUOUS
FREQUENCY: CONTINUOUS

## 2023-11-07 ASSESSMENT — PAIN DESCRIPTION - PAIN TYPE
TYPE: CHRONIC PAIN
TYPE: CHRONIC PAIN

## 2023-11-07 ASSESSMENT — PAIN DESCRIPTION - ONSET
ONSET: ON-GOING
ONSET: ON-GOING

## 2023-11-07 ASSESSMENT — PAIN DESCRIPTION - LOCATION
LOCATION: HIP;SHOULDER
LOCATION: BACK;SHOULDER;HIP
LOCATION: HIP;SHOULDER
LOCATION: HIP;SHOULDER
LOCATION: BACK;HIP;SHOULDER
LOCATION: BACK;HIP;SHOULDER

## 2023-11-07 ASSESSMENT — PAIN - FUNCTIONAL ASSESSMENT
PAIN_FUNCTIONAL_ASSESSMENT: PREVENTS OR INTERFERES SOME ACTIVE ACTIVITIES AND ADLS
PAIN_FUNCTIONAL_ASSESSMENT: PREVENTS OR INTERFERES SOME ACTIVE ACTIVITIES AND ADLS

## 2023-11-07 NOTE — PROGRESS NOTES
Pt refused to go get his insulin pen. Education given. Pt states \"I have enough insulin supplies at home, I am not going to get more. \"    All PIVs removed and port de accessed. All questions answered.

## 2023-11-07 NOTE — PLAN OF CARE
Problem: Discharge Planning  Goal: Discharge to home or other facility with appropriate resources  Outcome: Progressing  Flowsheets (Taken 11/7/2023 0400)  Discharge to home or other facility with appropriate resources:   Identify barriers to discharge with patient and caregiver   Arrange for needed discharge resources and transportation as appropriate   Identify discharge learning needs (meds, wound care, etc)     Problem: Safety - Adult  Goal: Free from fall injury  Outcome: Progressing  Flowsheets (Taken 11/7/2023 0751)  Free From Fall Injury:   Instruct family/caregiver on patient safety   Based on caregiver fall risk screen, instruct family/caregiver to ask for assistance with transferring infant if caregiver noted to have fall risk factors     Problem: Chronic Conditions and Co-morbidities  Goal: Patient's chronic conditions and co-morbidity symptoms are monitored and maintained or improved  Outcome: Progressing  Flowsheets (Taken 11/7/2023 0400)  Care Plan - Patient's Chronic Conditions and Co-Morbidity Symptoms are Monitored and Maintained or Improved:   Monitor and assess patient's chronic conditions and comorbid symptoms for stability, deterioration, or improvement   Collaborate with multidisciplinary team to address chronic and comorbid conditions and prevent exacerbation or deterioration   Update acute care plan with appropriate goals if chronic or comorbid symptoms are exacerbated and prevent overall improvement and discharge     Problem: Pain  Goal: Verbalizes/displays adequate comfort level or baseline comfort level  Outcome: Progressing

## 2023-11-07 NOTE — H&P
[facility]   - Continue roxicodone 30 mg q4hrs  - Home meds: Gabapentin 400 mg BID, celebrex, marinol, Bactrim    R IJ thrombus (09/06/23)  Started by Dr. Anh Dickerson at [facility]. Most likely related to cancer hx.  - Continued Eliquis 5 mg BID    Infectious   No active issues.     Code Status: Full Code   PPX: Eliquis  DISPO: ICU    Litzy Flores MD  Internal Medicine, PGY-1  11/6/2023  11:58 PM      This patient has been staffed and discussed with Julia Wells MD.

## 2023-11-07 NOTE — PLAN OF CARE
Problem: Discharge Planning  Goal: Discharge to home or other facility with appropriate resources  11/7/2023 1500 by Tarun Fairchild RN  Outcome: Adequate for Discharge     Problem: Safety - Adult  Goal: Free from fall injury  11/7/2023 1500 by Tarun Fairchild RN  Outcome: Adequate for Discharge     Problem: Chronic Conditions and Co-morbidities  Goal: Patient's chronic conditions and co-morbidity symptoms are monitored and maintained or improved  11/7/2023 1500 by Tarun Fairchild RN  Outcome: Adequate for Discharge     Problem: Pain  Goal: Verbalizes/displays adequate comfort level or baseline comfort level  11/7/2023 1500 by Tarun Fairchild RN  Outcome: Adequate for Discharge

## 2023-11-07 NOTE — PROGRESS NOTES
[facility] that [facility]. Pt refuses to have his port heparinized. Port has been saline locked and de accessed.

## 2023-11-07 NOTE — CONSULTS
2.1*   HGB 12.2* 11.0*   HCT 37.9* 33.5*   * 118*   MCV 91.1 89.6     Renal:    Recent Labs     11/06/23 1726 11/06/23 2206 11/07/23  0405   * 125* 136   K 4.0 5.7* 3.2*   CL 87* 93* 102   CO2 18* 14* 22   BUN 23* 19 17   CREATININE 0.8* 0.9 0.7*   GLUCOSE 732* 389* 109*   CALCIUM 9.1 8.9 8.8   MG 2.00  --  1.80   PHOS 2.9 2.6 1.9*   ANIONGAP 20* 18* 12     Hepatic:   Recent Labs     11/06/23 1726 11/06/23 2206   LABALBU 4.4 3.8     Troponin: No results for input(s): \"TROPONINI\" in the last 72 hours. BNP: No results for input(s): \"BNP\" in the last 72 hours. Lipids: No results for input(s): \"CHOL\", \"HDL\" in the last 72 hours. Invalid input(s): \"LDLCALCU\", \"TRIGLYCERIDE\"  ABGs:  No results for input(s): \"PHART\", \"SJV7IIH\", \"PO2ART\", \"RSP7FYH\", \"BEART\", \"THGBART\", \"P3TWNDMO\", \"RWG3NRB\" in the last 72 hours. INR: No results for input(s): \"INR\" in the last 72 hours. Lactate: No results for input(s): \"LACTATE\" in the last 72 hours. Cultures:  -----------------------------------------------------------------  RAD:   No orders to display         Assessment/Plan:   Modesto Beaulieu is a 37 y.o. male who presented to the ED with weakness and N/V. He is admitted to the ICU for DKA management. Neuro  None     Pulmonary  None     Cardiovascular  No active issues. GI  Diet: Diet NPO  GI ppx: None     Renal   No active issues     Endocrine  DKA 2/2 viral gastroenteritis vs chemotherapy vs radiation vs non-compliance  T2DM  Presented with weakness and N/V. Of note, most recently had radiation on 11/03, increased dose per patient. No signs of infection. Is compliant with insulin regimen at home; however, patient has not been since getting radiation. Last chemotherapy was 3 weeks ago. Also recently started decadron treatment, which most likely worsened hyperglycemia. Will admit to ICU for management of DKA. Started on insulin gtt and fluids.   - Continue insulin gtt until gap closes 2x.   Last AG 12

## 2023-11-07 NOTE — PROGRESS NOTES
4 Eyes Skin Assessment     NAME:  Derick Mathew  YOB: 1980  MEDICAL RECORD NUMBER:  [id number]    The patient is being assessed for  Admission    I agree that at least one RN has performed a thorough Head to Toe Skin Assessment on the patient. ALL assessment sites listed below have been assessed. Areas assessed by both nurses:    Head, Face, Ears, Shoulders, Back, Chest, Arms, Elbows, Hands, Sacrum. Buttock, Coccyx, Ischium, Legs. Feet and Heels, and Under Medical Devices         Does the Patient have a Wound?  No noted wound(s)       Jamie Prevention initiated by RN: Yes  Wound Care Orders initiated by RN: No    Pressure Injury (Stage 3,4, Unstageable, DTI, NWPT, and Complex wounds) if present, place Wound referral order by RN under : No    New Ostomies, if present place, Ostomy referral order under : No     Nurse 1 eSignature: Electronically signed by Muna Gonzalez RN on 11/7/23 at 6:04 AM EST    **SHARE this note so that the co-signing nurse can place an eSignature**    Nurse 2 eSignature: Electronically signed by Bala Leos RN on 11/7/23 at 6:08 AM EST

## 2023-11-07 NOTE — PROGRESS NOTES
Clinical Pharmacy Progress Note  Medication History     Admit Date: 11/6/2023    List of of current medications patient is taking is complete. Home Medication list in EPIC updated to reflect changes noted below. Source of information: patient interview, pharmacy fills, OARRS, and [facility] documentation    [facility]'s home [facility]: [facility]     Changes made to medication list:   Medications removed:   Humalog 70/30 mix  Zyrtec D  Medications added:   Humalog pen - per pt, takes 3 units before AM/late AM meal, and 3 units before evening meal.  Dexamethasone - per  provider, started in October  Eliquis 5mg BID  Dronabinol 10mg daily prn - for appetite  Medication doses adjusted:   Folic acid - changed from IV form to PO 5mg daily   Gabapentin - changed from liquid form to caps  Other notes:   Discussed with ICU team    Current Outpatient Medications   Medication Instructions    apixaban (ELIQUIS) 5 mg, Oral, 2 TIMES DAILY    celecoxib (CELEBREX) 100 mg, Oral, DAILY    cetirizine (ZYRTEC) 10 mg, Oral, DAILY    dexamethasone (DECADRON) 4 mg, Oral, DAILY WITH BREAKFAST    dronabinol (MARINOL) 10 mg, Oral, DAILY PRN    folic acid (FOLVITE) 5 mg, Oral, DAILY    gabapentin (NEURONTIN) 400 mg, Oral, 2 TIMES DAILY    insulin glargine (LANTUS) 15 Units, SubCUTAneous, NIGHTLY    insulin lispro (1 Unit Dial) (HUMALOG KWIKPEN) 3 Units, SubCUTAneous, 2 times daily with meals    oxyCODONE HCl (OXY-IR) 30 mg, Oral, EVERY 4 HOURS PRN       Please call with any questions.   Jaquan Cerda PharmD., [facility]   11/7/2023 11:32 AM  Wireless: [phone]

## 2023-11-07 NOTE — PROGRESS NOTES
HPI:  Didn't eat for three days - no energy, no appetite. Throwing up. Hasn't happened before to this degree. Has pancreatitis. Had abdominal pain as well. Has diarrhea x2 days. No idea how many bowel movements per day, had some fecal incontinence. No alcohol. Has type 2 DM. Takes humalog, 3 in morning and 3 in afternoon, last took Thursday. Didn't take because he wasn't eating. He is also on lantus but states it drops him if he doesn't eat. On 15U. Started dexamethasone for pain. Started radiation last week, but states he has had it before. Has generalized weakness. Has chest pain radiating to back. and shortness of breath x 2 days. PMHx: Epithelial sarcoma. Pancreatitis. 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 PE: has port, NAD, lethargic.

## 2023-11-07 NOTE — PLAN OF CARE
Problem: Safety - Adult  Goal: Free from fall injury  11/7/2023 1340 by Krzysztof Valdivia, RN  Note: Pt is a high fall risk (see [facility] Fall Risk assessment). Oriented pt to room and call light. Instructed pt to call for help when needed. Call light and personal items within reach. Bed in lowest position, brakes on, and 2/4 side rails up. Non-skid footwear on. Falls risk stop sign on door; hourly visual checks implemented. Falls risk arm band on pt. Bed alarm is on. Pt using call light appropriately. Will continue to monitor. Problem: Pain  Goal: Verbalizes/displays adequate comfort level or baseline comfort level  11/7/2023 1340 by Krzysztof Valdivia, RN  Note: Pt with complaints of 7/10 shoulder, back, and hip pain during shift. Medicated with PRN oxycodone per [facility] with good response per pt. Pt denies further needs at this time. Will continue to monitor and implement plan of care.

## 2023-11-07 NOTE — PROGRESS NOTES
2.1*   HGB 12.2* 11.0*   HCT 37.9* 33.5*   * 118*   MCV 91.1 89.6     Renal:    Recent Labs     11/06/23 1726 11/06/23 2206 11/07/23  0405   * 125* 136   K 4.0 5.7* 3.2*   CL 87* 93* 102   CO2 18* 14* 22   BUN 23* 19 17   CREATININE 0.8* 0.9 0.7*   GLUCOSE 732* 389* 109*   CALCIUM 9.1 8.9 8.8   MG 2.00  --  1.80   PHOS 2.9 2.6 1.9*   ANIONGAP 20* 18* 12     Hepatic:   Recent Labs     11/06/23 1726 11/06/23 2206   LABALBU 4.4 3.8     Troponin: No results for input(s): \"TROPONINI\" in the last 72 hours. BNP: No results for input(s): \"BNP\" in the last 72 hours. Lipids: No results for input(s): \"CHOL\", \"HDL\" in the last 72 hours. Invalid input(s): \"LDLCALCU\", \"TRIGLYCERIDE\"  ABGs:  No results for input(s): \"PHART\", \"VIU4XJB\", \"PO2ART\", \"DWA3HQU\", \"BEART\", \"THGBART\", \"Y8ZDTLKD\", \"ZJC4LNU\" in the last 72 hours. INR: No results for input(s): \"INR\" in the last 72 hours. Lactate: No results for input(s): \"LACTATE\" in the last 72 hours. Cultures:  -----------------------------------------------------------------  RAD:   No orders to display         Assessment/Plan:   Camelia Diez is a 37 y.o. male who presented to the ED with weakness and N/V. He is admitted to the ICU for DKA management. Neuro  None     Pulmonary  None     Cardiovascular  No active issues. GI  Diet: Diet NPO  GI ppx: None     Renal   No active issues     Endocrine  DKA 2/2 viral gastroenteritis vs chemotherapy vs radiation vs non-compliance  T2DM  Presented with weakness and N/V. Of note, most recently had radiation on 11/03, increased dose per patient. No signs of infection. Is compliant with insulin regimen at home; however, patient has not been since getting radiation. Last chemotherapy was 3 weeks ago. Also recently started decadron treatment, which most likely worsened hyperglycemia. Will admit to ICU for management of DKA. Started on insulin gtt and fluids.   - Continue insulin gtt until gap closes 2x.   Last AG 12

## 2023-11-07 NOTE — PROGRESS NOTES
Pt VS stable this AM.  Monitoring blood sugars and insulin drip as ordered. High fall risk interventions in place.

## 2023-11-07 NOTE — ED PROVIDER NOTES
ED Attending Attestation Note     Date of evaluation: 11/6/2023    This patient was seen by the resident. I have seen and examined the patient, agree with the workup, evaluation, management and diagnosis. The care plan has been discussed. I have reviewed the ECG and concur with the resident's interpretation. 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 Patient states he been very fatigued and weakness since he fistulogram of chemotherapy approximate 2 weeks ago. Over last few days his whole glucometer has been reading high and he daily or drink. The patient states he has not been using his insulin during the time because if he is not eating he does not want to use insulin as he may drop below. He denies any fevers. Denies any chest pain or trouble breathing. Examination find adult male, speaking in complete sentences. Patient with increased respiratory mental/muscle use during respiration. His abdomen soft, nondistended with mild tenderness in the upper aspects. He has regular rate. Concern for hyperglycemic emergency. We will plan for fluid resuscitation, laboratory work-up and insulin administration. Critical Care:  Due to the immediate potential for life-threatening deterioration due to hyperglycemic emergency, I spent 45 minutes providing critical care. This time excludes time spent performing procedures but includes time spent on direct patient care, history retrieval, review of the chart, and discussions with patient, family, and consultant(s).     Jazzmine Clifford MD MPH   Physician     Marquis Catracho MD  11/06/23 7915

## 2023-11-07 NOTE — PROGRESS NOTES
Pt admitted to ICU at this time. Pt oriented to room, all questions answered at this time. Bed in lowest position, wheels locked, alarm activated. Call light within reach. Pt able to make needs known.

## 2023-11-08 LAB
EST. AVERAGE GLUCOSE BLD GHB EST-MCNC: 237.4 MG/DL
EST. AVERAGE GLUCOSE BLD GHB EST-MCNC: 243.2 MG/DL
HBA1C MFR BLD: 10.1 %
HBA1C MFR BLD: 9.9 %

## 2023-11-08 NOTE — DISCHARGE SUMMARY
V2.0  Discharge Summary    Name:  Solis Fung /Age/Sex: 1980 (37 y.o. male)   Admit Date: 2023  Discharge Date: 23    MRN & CSN:  [id number] & [id number] Encounter Date and Time 23 4:17 PM EST    Attending:  No att. providers found Discharging Provider: Brien Post MD       Discharge Diagnosess:     DKA  Acute on chronic pancreatitis  Thrombocytopenia  Medication noncompliance        Admission HPI, hospital course, and consultants:     Admission HPI:  \"Patient is a 37 y.o. M w/ PMH of T2DM, metastatic epithelioid sarcoma who presented to the ED with worsening weakness that has been going on for past few days. Patient has not eaten for three days - no energy, no appetite. Had radiation on Friday, . He has been throwing up as well, with yelow emesis, unable to hold food down. Hasn't happened before to this degree, but notes that he has been admitted in the past for some similar instances that have been diagnosed as DKA. He has a history of pancreatitis. He has abdominal pain as well of note that is diffuse. Has diarrhea x2 days, no blood noted. No idea how many bowel movements per day, had some fecal incontinence. No recent alcohol. Has type 2 DM. Takes humalog, 3 in morning and 3 in afternoon, last took Thursday. Didn't take because he wasn't eating. He is also on lantus but states it drops him if he doesn't eat. On 15U. Started dexamethasone for pain. Has generalized weakness. Has chest pain radiating to back. and shortness of breath x 2 days. Went to ED for continuous weakness. PMHx: Epithelial sarcoma. Pancreatitis. 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 ED course:  Vitals WNL, elevated BP  Labs: Sodium 125 (correct 132), K of 5.7, AG of 18, Glucose of 389; Plt of 116  U/A: Glucose in urine, ketonuria  VBG: pH of 7.395, pCO2 of 33.4     Started on insulin gtt, fluids. [facility] course:  Gap closed x2, succesfully transitioned to basal-bolus regimen.  Patient advised to follow